# Patient Record
Sex: MALE | Race: OTHER | NOT HISPANIC OR LATINO | Employment: UNEMPLOYED | URBAN - METROPOLITAN AREA
[De-identification: names, ages, dates, MRNs, and addresses within clinical notes are randomized per-mention and may not be internally consistent; named-entity substitution may affect disease eponyms.]

---

## 2023-11-01 ENCOUNTER — TELEPHONE (OUTPATIENT)
Dept: SPORTS MEDICINE | Facility: CLINIC | Age: 28
End: 2023-11-01

## 2023-11-01 DIAGNOSIS — Z01.00 ENCOUNTER FOR COMPLETE EYE EXAM: ICD-10-CM

## 2023-11-01 DIAGNOSIS — Z00.00 ROUTINE PHYSICAL EXAMINATION: ICD-10-CM

## 2023-11-01 NOTE — TELEPHONE ENCOUNTER
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 Thomas Boss for an eye examination and dilation    He will need to be scheduled for a physical examination and have an EKG performed at that time.

## 2023-11-01 NOTE — TELEPHONE ENCOUNTER
Ok to order, will review when he schedules require physical examination, will also perform EKG as required at that time

## 2023-11-06 PROBLEM — M25.469 KNEE JOINT EFFUSION: Status: ACTIVE | Noted: 2023-11-06

## 2023-11-06 PROBLEM — M25.869 PATELLOFEMORAL DYSFUNCTION: Status: ACTIVE | Noted: 2023-11-06

## 2023-11-06 PROBLEM — M75.41 ROTATOR CUFF IMPINGEMENT SYNDROME OF RIGHT SHOULDER: Status: ACTIVE | Noted: 2023-11-06

## 2023-11-06 PROBLEM — S43.50XA SPRAIN OF ACROMIOCLAVICULAR LIGAMENT: Status: ACTIVE | Noted: 2023-11-06

## 2023-11-06 PROBLEM — B35.4 TINEA CORPORIS: Status: ACTIVE | Noted: 2018-09-24

## 2023-11-06 PROBLEM — J45.990 EXERCISE-INDUCED ASTHMA (HHS-HCC): Status: ACTIVE | Noted: 2023-11-06

## 2023-11-06 PROBLEM — M25.569 ARTHRALGIA OF LOWER LEG: Status: ACTIVE | Noted: 2023-11-06

## 2023-11-06 PROBLEM — R10.32 BILATERAL GROIN PAIN: Status: ACTIVE | Noted: 2023-11-06

## 2023-11-06 PROBLEM — S83.429A SPRAIN OF LATERAL COLLATERAL LIGAMENT OF KNEE: Status: ACTIVE | Noted: 2023-11-06

## 2023-11-06 PROBLEM — M75.42 IMPINGEMENT SYNDROME OF SHOULDER, LEFT: Status: ACTIVE | Noted: 2023-11-06

## 2023-11-06 PROBLEM — R10.31 BILATERAL GROIN PAIN: Status: ACTIVE | Noted: 2023-11-06

## 2023-11-06 PROBLEM — M19.019 DJD OF AC (ACROMIOCLAVICULAR) JOINT: Status: ACTIVE | Noted: 2023-11-06

## 2023-11-06 PROBLEM — J45.909 ASTHMA (HHS-HCC): Status: ACTIVE | Noted: 2023-11-06

## 2023-11-06 PROBLEM — J30.1 ALLERGIC RHINITIS DUE TO POLLEN: Status: ACTIVE | Noted: 2023-11-06

## 2023-11-06 PROBLEM — M17.11 RIGHT KNEE DJD: Status: ACTIVE | Noted: 2023-11-06

## 2023-11-06 PROBLEM — S62.309A FRACTURE OF METACARPAL BONE: Status: ACTIVE | Noted: 2023-11-06

## 2023-11-06 PROBLEM — J45.20 ASTHMA, MILD INTERMITTENT (HHS-HCC): Status: ACTIVE | Noted: 2018-09-24

## 2023-11-06 PROBLEM — S43.102A AC SEPARATION, LEFT, INITIAL ENCOUNTER: Status: ACTIVE | Noted: 2023-11-06

## 2023-11-06 PROBLEM — F98.8 ADD (ATTENTION DEFICIT DISORDER): Status: ACTIVE | Noted: 2018-09-24

## 2023-11-06 PROBLEM — S76.012A STRAIN OF FLEXOR MUSCLE OF LEFT HIP: Status: ACTIVE | Noted: 2023-11-06

## 2023-11-06 PROBLEM — M54.2 CERVICALGIA: Status: ACTIVE | Noted: 2018-09-24

## 2023-11-06 PROBLEM — M21.969 ACQUIRED DEFORMITY OF ANKLE AND FOOT: Status: ACTIVE | Noted: 2023-11-06

## 2023-11-06 PROBLEM — M95.8 WINGED SCAPULA: Status: ACTIVE | Noted: 2023-11-06

## 2023-11-06 RX ORDER — CREATINE 100 %
POWDER (GRAM) MISCELLANEOUS
COMMUNITY

## 2023-11-06 NOTE — PROGRESS NOTES
"Verbal consent of the patient and/or verbal parental consent for patients under the age of 18 have been obtained to conduct a physical examination at this office visit.    Established patient  History Of Present Illness  11/07/23 Prince Pablo is a 28 y.o. 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 He reports doing well and has zero complaints at this time. His current record is 3-0.    Last Recorded Vitals  /88   Pulse 82   Ht 1.727 m (5' 8\")   Wt 81.6 kg (180 lb)   BMI 27.37 kg/m²      All previous Progress Notes and imaging results related to this patients chief complaint have been reviewed in preparation for this examination.    Past Medical History  He has a past medical history of Asthma, History of fracture of hand, and Seasonal allergies.    Surgical History  He has a past surgical history that includes Knee cartilage surgery (Right, 2015).     Social History  He reports that he has never smoked. He has never used smokeless tobacco. He reports that he does not currently use alcohol. He reports that he does not use drugs.    Family History  No family history on file.     Allergies  Patient has no known allergies.    Historical Clinical Intake  March 7, 2023 Verbal consent of the patient and/or verbal parental consent for patients under the age of 18 have been obtained to conduct a physical examination at this office visit. Prince is a well established 27 year old male patient who presents for a physical examination and medical clearance prior to his next [employer] match on March 24th, 2023, in [place], [place]. He has participated in 9 matches so far with an 8/1 record. He has no complaints at this time. He reports exercising in the gym and training daily with no issues. He denies any pain at this time.  ---------------------------------  June 1, 2023 Verbal consent of the patient and/or verbal parental consent for patients " under the age of 18 have been obtained to conduct a physcial examination at this office visit. Prince, a well established patient, presents foir a physcial examination and medical clearance prior to his next [employer] match on June 16th, 2023. He has no current concerns or complaints. Prince continues to exercise and train on a daily basis. He denies any pain at this time.    General Examination:  GENERAL APPEARANCE: appears well, pleasant, and cooperative, NAD.   HEENT: normal, unremarkable.   NECK: supple.   HEART: RRR, normal S1S2.   LUNGS: clear to auscultation bilaterally.   CHEST:  normal.   ABDOMEN: soft, NT/ND, BS present.   EXTREMITIES: no cyanosis, clubbing.   SKIN: no rash or cellulitis.   PERIPHERAL PULSES:  +2/+4, bilaterally, Upper and Lower Extremities.   NEUROLOGIC EXAM: awake, A&O x 3, CN's II-XII grossly intact.   BACK:  normal.   MUSCULOSKELETAL:  normal range of motion all joints.   MALE GENITOURINARY:  Normal.   PSYCH: good eye contact, appropriate mood and affect .   ORAL CAVITY:  normal.   LYMPH NODES:  normal.   JOINTS:  bilateral Equal FROM.       Assessment   1. Sports physical  VERAB/Verify Abo/Rh Group Test    Sports physical performed for clearance for professional [employer] match; MEDICALLY CLEARED, FIT TO COMPETE        Laboratory studies:  The following lab results were reviewed in detail with the patient to the level of their understanding; a copy of these results were provided to the patient at the time of this office visit:  Complete Blood Count  HIV  Hep B Surface Antigen   Hep C Antibody  PT/PTT    *Pending results of ABO/Rh blood test to determine blood type    Diagnostic studies:  EKG performed at the time of this office visit; a copy of these results were provided to the patient at the time of this office visit.  Results: Borderline EKG. My personal over read is normal for Prince based upon prior EKG reports, however, recommendation to follow-up with Dr. Quynh Sun, Cardiologist,  whom he has seen previously for further evaluation and additional clearance.     Activity Instructions, Restrictions, and Accommodations:  No activity limitations or modifications are needed at this time.    Follow-up as needed.    OLGA RENDON on 11/7/23 at 12:31 PM.     Thomas Lewis DO, FAOASM

## 2023-11-06 NOTE — PATIENT INSTRUCTIONS
SPORTS PHYSICAL conducted at this office visit. The patient is medically cleared and is fit to complete in his upcoming [employer] match on December 15th, 2023, in [place].    Laboratory studies:  The following lab results were reviewed in detail with the patient to the level of their understanding; a copy of these results were provided to the patient at the time of this office visit:  Complete Blood Count  HIV  Hep B Surface Antigen   Hep C Antibody  PT/PTT    *Pending results of ABO/Rh blood test to determine blood type    Diagnostic studies:  EKG performed at the time of this office visit; a copy of these results were provided to the patient at the time of this office visit.  Results: Borderline EKG. My personal over read is normal for Prince based upon prior EKG reports, however, recommendation to follow-up with Dr. Quynh Sun, Cardiologist, whom he has seen previously for further evaluation and additional clearance.     Activity Instructions, Restrictions, and Accommodations:  No activity limitations or modifications are needed at this time.    Follow-up as needed.

## 2023-11-07 ENCOUNTER — OFFICE VISIT (OUTPATIENT)
Dept: SPORTS MEDICINE | Facility: CLINIC | Age: 28
End: 2023-11-07

## 2023-11-07 ENCOUNTER — LAB (OUTPATIENT)
Dept: LAB | Facility: LAB | Age: 28
End: 2023-11-07

## 2023-11-07 VITALS
WEIGHT: 180 LBS | BODY MASS INDEX: 27.28 KG/M2 | DIASTOLIC BLOOD PRESSURE: 88 MMHG | HEIGHT: 68 IN | HEART RATE: 82 BPM | SYSTOLIC BLOOD PRESSURE: 128 MMHG

## 2023-11-07 DIAGNOSIS — Z02.5 SPORTS PHYSICAL: Primary | ICD-10-CM

## 2023-11-07 DIAGNOSIS — Z00.00 ROUTINE PHYSICAL EXAMINATION: ICD-10-CM

## 2023-11-07 DIAGNOSIS — Z02.5 SPORTS PHYSICAL: ICD-10-CM

## 2023-11-07 LAB
ABO GROUP (TYPE) IN BLOOD: NORMAL
ERYTHROCYTE [DISTWIDTH] IN BLOOD BY AUTOMATED COUNT: 13.2 % (ref 11.5–14.5)
HCT VFR BLD AUTO: 42.8 % (ref 41–52)
HGB BLD-MCNC: 14.1 G/DL (ref 13.5–17.5)
INR PPP: 1 (ref 0.9–1.2)
MCH RBC QN AUTO: 28.5 PG (ref 26–34)
MCHC RBC AUTO-ENTMCNC: 32.9 G/DL (ref 32–36)
MCV RBC AUTO: 87 FL (ref 80–100)
NRBC BLD-RTO: 0 /100 WBCS (ref 0–0)
PLATELET # BLD AUTO: 265 X10*3/UL (ref 150–450)
PROTHROMBIN TIME: 10.3 SECONDS (ref 9.3–12.7)
RBC # BLD AUTO: 4.94 X10*6/UL (ref 4.5–5.9)
RH FACTOR (ANTIGEN D): NORMAL
WBC # BLD AUTO: 5.8 X10*3/UL (ref 4.4–11.3)

## 2023-11-07 PROCEDURE — 36415 COLL VENOUS BLD VENIPUNCTURE: CPT

## 2023-11-07 PROCEDURE — 85610 PROTHROMBIN TIME: CPT

## 2023-11-07 PROCEDURE — 86803 HEPATITIS C AB TEST: CPT

## 2023-11-07 PROCEDURE — 85027 COMPLETE CBC AUTOMATED: CPT

## 2023-11-07 PROCEDURE — 87536 HIV-1 QUANT&REVRSE TRNSCRPJ: CPT

## 2023-11-07 PROCEDURE — 99213 OFFICE O/P EST LOW 20 MIN: CPT | Performed by: FAMILY MEDICINE

## 2023-11-07 PROCEDURE — 87340 HEPATITIS B SURFACE AG IA: CPT

## 2023-11-07 PROCEDURE — 1036F TOBACCO NON-USER: CPT | Performed by: FAMILY MEDICINE

## 2023-11-07 ASSESSMENT — PAIN - FUNCTIONAL ASSESSMENT: PAIN_FUNCTIONAL_ASSESSMENT: NO/DENIES PAIN

## 2023-11-08 LAB
HBV SURFACE AG SERPL QL IA: NONREACTIVE
HCV AB SER QL: NONREACTIVE
HIV1 RNA # PLAS NAA DL=20: NOT DETECTED {COPIES}/ML
HIV1 RNA SPEC NAA+PROBE-LOG#: NORMAL {LOG_COPIES}/ML

## 2024-05-09 ENCOUNTER — TELEPHONE (OUTPATIENT)
Dept: SPORTS MEDICINE | Facility: CLINIC | Age: 29
End: 2024-05-09

## 2024-05-09 DIAGNOSIS — Z01.00 ENCOUNTER FOR COMPLETE EYE EXAM: ICD-10-CM

## 2024-05-09 DIAGNOSIS — Z02.5 SPORTS PHYSICAL: ICD-10-CM

## 2024-05-09 NOTE — TELEPHONE ENCOUNTER
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 Boss for a dilated eye exam    Please see/sign orders, thank you

## 2024-05-09 NOTE — TELEPHONE ENCOUNTER
Orders have been signed, please let patient know to have these completed prior to his evaluation on the 11th if able to since he is training in [place]

## 2024-05-13 ENCOUNTER — LAB (OUTPATIENT)
Dept: LAB | Facility: LAB | Age: 29
End: 2024-05-13

## 2024-05-13 DIAGNOSIS — Z02.5 SPORTS PHYSICAL: ICD-10-CM

## 2024-05-13 LAB
ERYTHROCYTE [DISTWIDTH] IN BLOOD BY AUTOMATED COUNT: 13.1 % (ref 11.5–14.5)
HBV SURFACE AG SERPL QL IA: NONREACTIVE
HCT VFR BLD AUTO: 43.1 % (ref 41–52)
HCV AB SER QL: NONREACTIVE
HGB BLD-MCNC: 14 G/DL (ref 13.5–17.5)
HIV 1+2 AB+HIV1 P24 AG SERPL QL IA: NONREACTIVE
MCH RBC QN AUTO: 28.9 PG (ref 26–34)
MCHC RBC AUTO-ENTMCNC: 32.5 G/DL (ref 32–36)
MCV RBC AUTO: 89 FL (ref 80–100)
NRBC BLD-RTO: 0 /100 WBCS (ref 0–0)
PLATELET # BLD AUTO: 248 X10*3/UL (ref 150–450)
RBC # BLD AUTO: 4.84 X10*6/UL (ref 4.5–5.9)
WBC # BLD AUTO: 6.6 X10*3/UL (ref 4.4–11.3)

## 2024-05-13 PROCEDURE — 85027 COMPLETE CBC AUTOMATED: CPT

## 2024-05-13 PROCEDURE — 86803 HEPATITIS C AB TEST: CPT

## 2024-05-13 PROCEDURE — 83520 IMMUNOASSAY QUANT NOS NONAB: CPT

## 2024-05-13 PROCEDURE — 87340 HEPATITIS B SURFACE AG IA: CPT

## 2024-05-13 PROCEDURE — 87389 HIV-1 AG W/HIV-1&-2 AB AG IA: CPT

## 2024-05-13 PROCEDURE — 36415 COLL VENOUS BLD VENIPUNCTURE: CPT

## 2024-05-13 NOTE — PROGRESS NOTES
Verbal consent of the patient and/or verbal parental consent for patients under the age of 18 have been obtained to conduct a physical examination at this office visit.    Established patient  History Of Present Illness  05/13/24 Prince Pablo is a 28 y.o. male who presents for a Sports Physical in preparation for his next [facility] exhibition in [place] on May 24th. He denies any pain or complaints at this time. He is scheduled for his dilated eye exam this afternoon with an Ophthalmologist.     All previous Progress Notes and imaging results related to this patients chief complaint have been reviewed in preparation for this examination.    Past Medical History  He has a past medical history of Asthma ([facility]), History of fracture of hand, and Seasonal allergies.    Surgical History  He has a past surgical history that includes Knee cartilage surgery (Right, 2015).     Social History  He reports that he has never smoked. He has never used smokeless tobacco. He reports that he does not currently use alcohol. He reports that he does not use drugs.    Family History  No family history on file.     Allergies  Patient has no known allergies.    Review of Systems  General appearance: WDWN male.  ENT: ears and throat normal  Eyes: Vision : 20/ without correction  PERRLA, fundi normal.  Neck: supple, thyroid normal, no adenopathy  Lungs:  clear, no wheezing or rales  Heart: no murmur, regular rate and rhythm, normal S1 and S2  Abdomen: no masses palpated, no organomegaly or tenderness  Genitalia: normal male genitals, no testicular masses or hernia  Spine: normal, no scoliosis  Skin: Normal with no acne noted.  Neuro: normal  Extremities: normal    Physical Exam  Vitals and nursing note reviewed. Exam conducted with a chaperone present.   HENT:      Head: Normocephalic.      Right Ear: Tympanic membrane normal.      Left Ear: Tympanic membrane normal.      Nose: Nose normal.      Mouth/Throat:      Mouth: Mucous  membranes are dry.      Pharynx: Oropharynx is clear.   Eyes:      General: Lids are normal. Gaze aligned appropriately.      Extraocular Movements: Extraocular movements intact.      Conjunctiva/sclera: Conjunctivae normal.      Pupils: Pupils are equal, round, and reactive to light.   Cardiovascular:      Rate and Rhythm: Normal rate and regular rhythm.      Pulses: Normal pulses.      Heart sounds: Normal heart sounds.   Pulmonary:      Effort: Pulmonary effort is normal.      Breath sounds: Normal breath sounds.   Chest:   Breasts:     Right: Normal.      Left: Normal.   Abdominal:      General: Abdomen is flat. Bowel sounds are normal.      Palpations: Abdomen is soft.   Genitourinary:     Penis: Normal.    Musculoskeletal:         General: Normal range of motion.      Cervical back: Normal range of motion.      Right lower leg: No edema.      Left lower leg: No edema.   Skin:     General: Skin is warm and dry.      Capillary Refill: Capillary refill takes less than 2 seconds.      Findings: Abrasion present.             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      Mental Status: He is alert and oriented to person, place, and time. Mental status is at baseline.      Cranial Nerves: Cranial nerves 2-12 are intact.      Sensory: Sensation is intact.      Motor: Motor function is intact.      Coordination: Coordination is intact.      Gait: Gait is intact.      Deep Tendon Reflexes: Reflexes are normal and symmetric.      Reflex Scores:       Tricep reflexes are 2+ on the right side and 2+ on the left side.       Bicep reflexes are 2+ on the right side and 2+ on the left side.       Brachioradialis reflexes are 2+ on the right side and 2+ on the left side.       Patellar reflexes are 2+ on the right side and 2+ on the left side.       Achilles reflexes are 2+ on the right side and 2+ on the left  side.  Psychiatric:         Attention and Perception: Attention and perception normal.         Mood and Affect: Mood and affect normal.         Speech: Speech normal.         Behavior: Behavior normal. Behavior is cooperative.         Thought Content: Thought content normal.         Cognition and Memory: Cognition normal.         Judgment: Judgment normal.         [x] Medically eligible for ALL sports without restriction    [] Medically eligible for ALL sports without restriction with recommendations for further evaluation or treatment of:    [] Medically eligible for certain sports      I have examined the student and completed the preparticipation physical evaluation. The athlete does not have apparent clinical contraindications to practice and can participate in the sport(s) outlined. If conditions arise after the athlete has been cleared for participation, the physician may rescind the medial eligibility until the problem is resolved and the potential consequences are completely explained to the athlete and/or parent(s) and/or guardian(s). A copy of the [facility] Preparticipation Physical Evaluation Form is on record in my office and can be made available at the request of the patient's parent(s) and/or guardian(s).          Imaging and Diagnostics Review:    5/13/2024  3:48 PM  Component Value Flag Ref Range Units Status   HIV 1/2 Antigen/Antibody Screen with Reflex to Confirmation Nonreactive  Nonreactive  Final     Narrative    HIV Ag/Ab screen is performed using the Siemens [employer] HIV Ag/Ab Combo assay which detects the presence of HIV p24 antigen as well as antibodies to HIV-1 (Group M and O) and HIV-2.  No laboratory evidence of HIV infection. If acute HIV infection is suspected, consider testing for HIV RNA by PCR (viral load).       5/13/2024  3:16 PM  Component Value Flag Ref Range Units Status   Hepatitis B Surface AG Nonreactive  Nonreactive  Final   Comment:   Biotin interference may cause falsely  decreased results. Patients taking a Biotin dose of up to 5 mg/day should refrain from taking Biotin for 24 hours before sample collection. Providers may contact their local laboratory for  further information.       5/13/2024  3:37 PM  Component Value Flag Ref Range Units Status   Hepatitis C AB Nonreactive  Nonreactive  Final   Comment:   Results from patients taking biotin supplements or receiving high-dose biotin therapy should be interpreted with caution due to possible interference with this test. Providers may contact their local laboratory for further information.       5/13/2024  3:14 PM  Component Value Flag Ref Range Units Status   WBC 6.6  4.4 - 11.3 x10*3/uL Final   nRBC 0.0  0.0 - 0.0 /100 WBCs Final   RBC 4.84  4.50 - 5.90 x10*6/uL Final   Hemoglobin 14.0  13.5 - 17.5 g/dL Final   Hematocrit 43.1  41.0 - 52.0 % Final   MCV 89  80 - 100 fL Final   MCH 28.9  26.0 - 34.0 pg Final   MCHC 32.5  32.0 - 36.0 g/dL Final   RDW 13.1  11.5 - 14.5 % Final   Platelets 248  150 - 450 x10*3/uL Final     PENDING RESULTS:  Co GM1 Quattro Autoantibody Test (Order #[id number]) on 5/13/24       Assessment   1. Sports physical            Treatment or Intervention:  Referral to Ophthalmology for a dilated eye examination as required by the [place] [employer] in preparation for his exhibition on May 24, 2024; appointment scheduled 05/14/24 at 2PM  F/U as  needed in the future or need physical done before next fight    OLGA RENDON on 5/13/24 at 6:02 PM.     Thomas Lewis DO, FAOASM

## 2024-05-14 ENCOUNTER — OFFICE VISIT (OUTPATIENT)
Dept: SPORTS MEDICINE | Facility: CLINIC | Age: 29
End: 2024-05-14

## 2024-05-14 VITALS
WEIGHT: 180 LBS | HEART RATE: 74 BPM | SYSTOLIC BLOOD PRESSURE: 126 MMHG | HEIGHT: 68 IN | BODY MASS INDEX: 27.28 KG/M2 | DIASTOLIC BLOOD PRESSURE: 68 MMHG

## 2024-05-14 DIAGNOSIS — Z02.5 SPORTS PHYSICAL: ICD-10-CM

## 2024-05-14 PROCEDURE — 99213 OFFICE O/P EST LOW 20 MIN: CPT | Performed by: FAMILY MEDICINE

## 2024-05-14 PROCEDURE — 1036F TOBACCO NON-USER: CPT | Performed by: FAMILY MEDICINE

## 2024-05-14 ASSESSMENT — PATIENT HEALTH QUESTIONNAIRE - PHQ9
1. LITTLE INTEREST OR PLEASURE IN DOING THINGS: NOT AT ALL
SUM OF ALL RESPONSES TO PHQ9 QUESTIONS 1 AND 2: 0
2. FEELING DOWN, DEPRESSED OR HOPELESS: NOT AT ALL

## 2024-05-14 ASSESSMENT — ENCOUNTER SYMPTOMS
LOSS OF SENSATION IN FEET: 0
OCCASIONAL FEELINGS OF UNSTEADINESS: 0
DEPRESSION: 0

## 2024-05-14 ASSESSMENT — PAIN SCALES - GENERAL: PAINLEVEL: 0-NO PAIN

## 2024-05-14 ASSESSMENT — PAIN - FUNCTIONAL ASSESSMENT: PAIN_FUNCTIONAL_ASSESSMENT: NO/DENIES PAIN

## 2024-05-14 ASSESSMENT — LIFESTYLE VARIABLES
HOW MANY STANDARD DRINKS CONTAINING ALCOHOL DO YOU HAVE ON A TYPICAL DAY: PATIENT DOES NOT DRINK
HOW OFTEN DO YOU HAVE A DRINK CONTAINING ALCOHOL: NEVER

## 2024-05-14 NOTE — PATIENT INSTRUCTIONS
Treatment or Intervention:  Referral to Ophthalmology for a dilated eye examination as required by the [place] [employer] in preparation for his exhibition on May 24, 2024; appointment scheduled 05/14/24 at 2PM

## 2024-06-25 LAB — SCAN RESULT: NORMAL

## 2024-12-30 ENCOUNTER — OFFICE VISIT (OUTPATIENT)
Dept: SPORTS MEDICINE | Facility: CLINIC | Age: 29
End: 2024-12-30
Payer: COMMERCIAL

## 2024-12-30 ENCOUNTER — HOSPITAL ENCOUNTER (OUTPATIENT)
Dept: RADIOLOGY | Facility: CLINIC | Age: 29
Discharge: HOME | End: 2024-12-30
Payer: COMMERCIAL

## 2024-12-30 VITALS
DIASTOLIC BLOOD PRESSURE: 70 MMHG | SYSTOLIC BLOOD PRESSURE: 138 MMHG | WEIGHT: 180 LBS | HEIGHT: 68 IN | HEART RATE: 65 BPM | BODY MASS INDEX: 27.28 KG/M2

## 2024-12-30 DIAGNOSIS — M79.641 PAIN OF RIGHT HAND: ICD-10-CM

## 2024-12-30 DIAGNOSIS — S63.501A RIGHT WRIST SPRAIN, INITIAL ENCOUNTER: ICD-10-CM

## 2024-12-30 DIAGNOSIS — S60.221A: ICD-10-CM

## 2024-12-30 DIAGNOSIS — M79.89 SWELLING OF RIGHT HAND: ICD-10-CM

## 2024-12-30 DIAGNOSIS — S69.81XA TFCC (TRIANGULAR FIBROCARTILAGE COMPLEX) INJURY, RIGHT, INITIAL ENCOUNTER: ICD-10-CM

## 2024-12-30 PROCEDURE — 73110 X-RAY EXAM OF WRIST: CPT | Mod: RT

## 2024-12-30 PROCEDURE — 3008F BODY MASS INDEX DOCD: CPT | Performed by: FAMILY MEDICINE

## 2024-12-30 PROCEDURE — 1036F TOBACCO NON-USER: CPT | Performed by: FAMILY MEDICINE

## 2024-12-30 PROCEDURE — 73130 X-RAY EXAM OF HAND: CPT | Mod: RT

## 2024-12-30 PROCEDURE — 99213 OFFICE O/P EST LOW 20 MIN: CPT | Performed by: FAMILY MEDICINE

## 2024-12-30 PROCEDURE — 73130 X-RAY EXAM OF HAND: CPT | Mod: RIGHT SIDE | Performed by: RADIOLOGY

## 2024-12-30 PROCEDURE — 73110 X-RAY EXAM OF WRIST: CPT | Mod: RIGHT SIDE | Performed by: RADIOLOGY

## 2024-12-30 ASSESSMENT — COLUMBIA-SUICIDE SEVERITY RATING SCALE - C-SSRS
6. HAVE YOU EVER DONE ANYTHING, STARTED TO DO ANYTHING, OR PREPARED TO DO ANYTHING TO END YOUR LIFE?: NO
1. IN THE PAST MONTH, HAVE YOU WISHED YOU WERE DEAD OR WISHED YOU COULD GO TO SLEEP AND NOT WAKE UP?: NO
2. HAVE YOU ACTUALLY HAD ANY THOUGHTS OF KILLING YOURSELF?: NO

## 2024-12-30 ASSESSMENT — PATIENT HEALTH QUESTIONNAIRE - PHQ9
SUM OF ALL RESPONSES TO PHQ9 QUESTIONS 1 AND 2: 0
1. LITTLE INTEREST OR PLEASURE IN DOING THINGS: NOT AT ALL
2. FEELING DOWN, DEPRESSED OR HOPELESS: NOT AT ALL

## 2024-12-30 ASSESSMENT — PAIN SCALES - GENERAL
PAINLEVEL_OUTOF10: 1
PAINLEVEL_OUTOF10: 1

## 2024-12-30 ASSESSMENT — PAIN - FUNCTIONAL ASSESSMENT: PAIN_FUNCTIONAL_ASSESSMENT: 0-10

## 2024-12-30 ASSESSMENT — ENCOUNTER SYMPTOMS
DEPRESSION: 0
LOSS OF SENSATION IN FEET: 0
OCCASIONAL FEELINGS OF UNSTEADINESS: 0
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
